# Patient Record
Sex: MALE | URBAN - NONMETROPOLITAN AREA
[De-identification: names, ages, dates, MRNs, and addresses within clinical notes are randomized per-mention and may not be internally consistent; named-entity substitution may affect disease eponyms.]

---

## 2021-02-17 ENCOUNTER — APPOINTMENT (RX ONLY)
Dept: URBAN - NONMETROPOLITAN AREA CLINIC 13 | Facility: CLINIC | Age: 86
Setting detail: DERMATOLOGY
End: 2021-02-17

## 2021-02-17 DIAGNOSIS — L57.0 ACTINIC KERATOSIS: ICD-10-CM

## 2021-02-17 DIAGNOSIS — L72.0 EPIDERMAL CYST: ICD-10-CM

## 2021-02-17 DIAGNOSIS — D485 NEOPLASM OF UNCERTAIN BEHAVIOR OF SKIN: ICD-10-CM

## 2021-02-17 PROBLEM — D48.5 NEOPLASM OF UNCERTAIN BEHAVIOR OF SKIN: Status: ACTIVE | Noted: 2021-02-17

## 2021-02-17 PROCEDURE — ? LIQUID NITROGEN

## 2021-02-17 PROCEDURE — ? PATIENT SPECIFIC COUNSELING

## 2021-02-17 PROCEDURE — 99202 OFFICE O/P NEW SF 15 MIN: CPT | Mod: 25

## 2021-02-17 PROCEDURE — ? DEFER

## 2021-02-17 PROCEDURE — 17000 DESTRUCT PREMALG LESION: CPT

## 2021-02-17 PROCEDURE — 17003 DESTRUCT PREMALG LES 2-14: CPT

## 2021-02-17 ASSESSMENT — LOCATION SIMPLE DESCRIPTION DERM
LOCATION SIMPLE: LEFT WRIST
LOCATION SIMPLE: NOSE
LOCATION SIMPLE: NOSE
LOCATION SIMPLE: RIGHT HAND
LOCATION SIMPLE: RIGHT FOREARM
LOCATION SIMPLE: LEFT FOREARM
LOCATION SIMPLE: LEFT HAND
LOCATION SIMPLE: LEFT FOREARM

## 2021-02-17 ASSESSMENT — LOCATION DETAILED DESCRIPTION DERM
LOCATION DETAILED: LEFT MEDIAL DORSAL WRIST
LOCATION DETAILED: LEFT PROXIMAL DORSAL FOREARM
LOCATION DETAILED: LEFT RADIAL DORSAL HAND
LOCATION DETAILED: RIGHT RADIAL DORSAL HAND
LOCATION DETAILED: LEFT ULNAR DORSAL HAND
LOCATION DETAILED: NASAL DORSUM
LOCATION DETAILED: RIGHT PROXIMAL DORSAL FOREARM
LOCATION DETAILED: LEFT DISTAL DORSAL FOREARM
LOCATION DETAILED: LEFT PROXIMAL DORSAL FOREARM
LOCATION DETAILED: RIGHT ULNAR DORSAL HAND
LOCATION DETAILED: NASAL DORSUM

## 2021-02-17 ASSESSMENT — LOCATION ZONE DERM
LOCATION ZONE: NOSE
LOCATION ZONE: HAND
LOCATION ZONE: ARM
LOCATION ZONE: ARM
LOCATION ZONE: NOSE

## 2021-02-17 NOTE — PROCEDURE: PATIENT SPECIFIC COUNSELING
both milia on the nose prepped and Lanced with #11 blade and keratinaceous contents expressed. wound care discussed.
Detail Level: Detailed
pustular cellulitis in very well appearing young male. no concern for septic arthritis or abscess. recommend supportive care, warm soaks, PO keflex/bactrim. f/u wound check in 3 days. return precautions provided

## 2021-02-17 NOTE — PROCEDURE: DEFER
Introduction Text (Please End With A Colon): :)
Instructions (Optional): Sizes are: L hand .8, R hand 1.5 X .5, L elbow 3 X 3.5
Detail Level: Detailed
Other Procedure: shave biopsy